# Patient Record
(demographics unavailable — no encounter records)

---

## 2024-10-31 NOTE — PHYSICAL EXAM
[Chaperone Present] : A chaperone was present in the examining room during all aspects of the physical examination [60215] : A chaperone was present during the pelvic exam. [No Acute Distress] : in no acute distress [Well developed] : well developed [Well Nourished] : ~L well nourished [FreeTextEntry2] : Charis [FreeTextEntry1] : Indication: ROBERTO Urethra was prepped in sterile fashion and then a sterile non indwelling catheter (14F) was used by me to drain the bladder. The patient tolerated the procedure well.  void: 250cc PVR: 200cc

## 2024-10-31 NOTE — HISTORY OF PRESENT ILLNESS
[FreeTextEntry1] : Patient is here for 9 months follow up for ROBERTO, recurrent UTIs, incomplete uterovaginal prolapse.  Last seen on 1/23/24 for med follow up.   History of asymptomatic bacteriuria (only symptom is odor)  8/23/2022: urodynamics: Impression mildly sensitive bladder (capacity 262 cc), incomplete bladder emptying that does not improve enough with prolapse reduction, pelvic floor hypertonicity, urethra contractility, +obstructive voiding Plan: renal imaging, consider adding muscle relaxant +/- flomax to pessary management, or intermittent self cathing or indwelling catheter 7/27/2022: CT: normal   s/p Tizanidine 2 mg BID, felt dizzy Patient and daughter counseled on treatment options for ROBERTO, saleh vs self cath vs observation or trying another muscles relaxant Patient and her daughter chose observation with renal sonograms every 6 month  12/8/2022 renal sono- small renal cortical cysts bilaterally, no calculus or hydronephrosis on either side, increased renal cortical echotexture, consistent with medical renal disease 1/21/2023 urine testing-contaminated, treated with Bactrim DS BID x 7 days  5/30/23, pelvic sonogram, normal ES: 3mm 5/30/23, renal sonogram: no hydronephrosis, snows small renal cysts bilaterally, mostly simple, sub centimeter mildly complex. Advised to follow up with Dr Francis  Estrace cream  11/20/23, +Ucx: >100K E Coli, R: Bactrim, Cipro, Levaquin I: Cefazolin, Treated with Macrobid 11/6/23, Pelvis sono: ES: 3mm, distended bladder 11/5/23, CT abdomen/pelvis: no hydronephrosis bilaterally 11/5/23, BUN/Cr: 21/1   s/p Ring and support # 3, removed 11/2023 due to bleeding  2/15/24, TV sono: ES: 2mm, normal 4/26/24, CT scan of abdomen/pelvis: no hydronephrosis per Dr Francis  Today, patient states that she is not bothered by the prolapse at this time and does not want the pessary back. Reports that she has been getting frequent UTIs and been treated by urgent care few weeks ago. Usually her symptoms is urinary odor. Saw Dr Francis recently for renal cysts, had CT scan in May, stable. Denies any current complains. Denies any bleeding. Feels that she empties the bladder well, usually has to sit a little longer to be able to empty better. Want to continue to observe and follow with renal sonograms.

## 2024-10-31 NOTE — COUNSELING
[FreeTextEntry1] : If you feel like you have an infection it is important for you to call our office and we will arrange testing of your urine.  We will contact you if the urine results are abnormal.  Please schedule renal sonogram to evaluate the kidneys.   Please continue to apply a pea size amount of estrogen cream to the opening of the vagina three times a week.   Schedule a 6 months follow up appointment with YOAN Jaimes.

## 2024-10-31 NOTE — DISCUSSION/SUMMARY
[FreeTextEntry1] : Incomplete bladder emptying PVR: 200cc Patient and daughter are counseled about ROBERTO affecting the kidneys, causing kidney damage. Will continue to observe and follow up with renal sonograms every 6 months Referral given today and follow up in 6 months   Recurrent UTIs Urine culture obtained. Will follow up. Will treat accordingly if necessary Advised that ROBERTO may cause recurrent UTIs, patient usually just has an odor only but would like to test and be treated  Atrophic Vaginitis: Advised to continue to apply a pea size amount of the cream to the opening of the vagina three nights per week.  Complete uterovaginal prolapse + complete prolapse on the exam today Patient is not bothered and does not want pessary management

## 2024-10-31 NOTE — REASON FOR VISIT
[TextEntry] : Reason for visit:  Voids per day: 5-6   Voids per night: 3-7  Urge incontinence: Yes   Stress incontinence: No Constipation: No, unsing Miralax prn Fecal incontinence: No Vaginal bulge: Yes

## 2024-10-31 NOTE — PHYSICAL EXAM
[Chaperone Present] : A chaperone was present in the examining room during all aspects of the physical examination [23705] : A chaperone was present during the pelvic exam. [No Acute Distress] : in no acute distress [Well developed] : well developed [Well Nourished] : ~L well nourished [FreeTextEntry2] : Charis [FreeTextEntry1] : Indication: ROBERTO Urethra was prepped in sterile fashion and then a sterile non indwelling catheter (14F) was used by me to drain the bladder. The patient tolerated the procedure well.  void: 250cc PVR: 200cc

## 2025-01-10 NOTE — REASON FOR VISIT
[Arrhythmia/ECG Abnorrmalities] : arrhythmia/ECG abnormalities [Structural Heart and Valve Disease] : structural heart and valve disease [Hyperlipidemia] : hyperlipidemia [Hypertension] : hypertension [Follow-Up - Clinic] : a clinic follow-up of [FreeTextEntry3] : Dr. Sage  [FreeTextEntry2] : 1 year TAVR F/U 26 mm Medtronic Valve 3/2020 [facility]

## 2025-01-10 NOTE — ASSESSMENT
[FreeTextEntry1] : The patient has a mechanical MVR for MS and has had a TAVR for AS . She did not have significant CAD in 2020 . The patient has severe peripheral neuropathy . She has been tried on multiple medications in the past . On exam her pedal pulses are  present R> L and previous arterial Doppler showed no significant stenosis  although had diffuse disease . . The patient is seeing pain management and will have her medication adjusted  The patient was admitted to [facility] with dizziness and hypeonatremia . Medications were adjusted . Her CT scan of her head showed an old cerebellar stroke . She had HCTZ stopped and was placed on torsemide . Her BP is low normal . It is uncertain why she is was given a loop diuretic . The patent was readmitted to [facility] wiht severe dehydration after having diarrhea . Torsemide has been stopped and Lisinopril was stopped . The patient has not had SOB or CP .   It is unclear if she is taking Amlodipine . She has no DVT and has a right sided Baker's cyst . He edema is stable . This may be from her Amlodipine. She is taking Furosemide . There is a midly increased RVSP on echo . RAD showed no TONJA . She has renal cysts and is going to see a urologist. It is uncertain by hisotry why she is on ASA . She has had a CVA in the past as well ( cerebellar )    The patient was seen in the ER for severe dizziness , Etiology is uncertain but may be related to her chronic cerebellar infarcts . She does have a history of havingb diastolic HF and  MVR mechanical. Her INR was not therapeutioc when  in the ER . Discussed need for coumadin center follow up and it is essential that her INR is 2.5 - 3.5 .

## 2025-01-10 NOTE — HISTORY OF PRESENT ILLNESS
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 Tim 29 mm MVR (1991 [place] - Dr. Marcos), A-fib (Coumadin), HTN, cholecystectomy, hernia repair.On 3/11/2020 the patient underwent elective TAVR.  The patient tolerated the procedure well and had an uncomplicated post-operative course..The patient has AF with controlled VR on ECG . . The patient had been seen by vascular for leg paresthesias . She had normal arterial circulation and she was told by neurology that she had a neuropathy . The patient was placed on Amitriptyline with improvement in her symptoms  The patient had fallen in [place] . She calims to have not hit her head . She had a CT scan of her head which was said to be ok . The patient has had intermittent lightheaded which is intermittent .   The patient was admitted to [facility] . She had clams and got sick afterwards. She was subsequently admitted with acute kidney injury . The patient had urinary retention . No hydro.  She improved wiht hydration and catheterization . She has been feeling fine since she has been oput of the hospital. She did have some swollen legs and this had improved . She took an extra water pill for 7 days with improvement   The patient 's main complaint has been her lower extremity pain which is thought to be from a neuropathy . She has not recieved relief from Duoxetine, Lyrica or gabapentin .  She has had SOB which she attibuted to her pain .  The patient was subsequently admitted to [facility] with dizziness . CT scan of the head showed an old cerebellar infarct and microvascular disease . She had a cardiac work up including a neuclear stress test which was negative for ischemia and LV systolic function MVR and AVR were ok  She was hyponatremic .  1- The patient was admitted to [facility] ER with lightheadedness and unsteadiness  . She had a CTA of head which showed chroni cerebellar infracts .There were irregularities of the Right MCA . She did have severe degenerative changes in cervical spine .

## 2025-01-10 NOTE — PHYSICAL EXAM
[General Appearance - Well Developed] : well developed [Normal Appearance] : normal appearance [General Appearance - Well Nourished] : well nourished [Normal Conjunctiva] : the conjunctiva exhibited no abnormalities [Normal Oral Mucosa] : normal oral mucosa [Respiration, Rhythm And Depth] : normal respiratory rhythm and effort [Exaggerated Use Of Accessory Muscles For Inspiration] : no accessory muscle use [Normal Rate] : normal [Rhythm Regular] : regular [Normal S1] : normal S1 [Normal S2] : normal S2 [Bowel Sounds] : normal bowel sounds [Abdomen Tenderness] : non-tender [Abnormal Walk] : normal gait [Nail Clubbing] : no clubbing of the fingernails [Skin Color & Pigmentation] : normal skin color and pigmentation [Skin Turgor] : normal skin turgor [] : no rash [No Venous Stasis] : no venous stasis [Oriented To Time, Place, And Person] : oriented to person, place, and time [Impaired Insight] : insight and judgment were intact [Affect] : the affect was normal [Mood] : the mood was normal [No Anxiety] : not feeling anxious

## 2025-01-10 NOTE — CARDIOLOGY SUMMARY
[de-identified] : 2-7-2022 AF LAFB Poor R wave -progressin V1-V3  6- AF LAFB  6- AF LAFB NS ST-T change.  4- AF with slow VR Poor R wave progression V1-V3  6- AF LAFB Poor  Rwave progression V1-V3  1- AF LAFB slow VR  [de-identified] : 3- Lexiscan stress test No ischemia  [de-identified] : 3- Noraml LV systolic function Normal 26 mm Core Valve Severely dilated LA mild Concentric LVH  3- Normal LV systolic function MVR Mechanical peak and mean diastolic gradient of 8 mmhg and 1 mmhg . AVR TAVPR SHAHRIAR of 2.2. 3-6-2023 EF 63% Mechanical MVR 2.9 cm2  S.P TAVR AV 1.97  mild TR RVSP was 29 mmhg   3-5-2024 EF 58% TAVR  MVR in place and functioning normqllyu RVSP was 35.7 mmhg  [de-identified] : 2020 No CAD

## 2025-02-28 NOTE — ASSESSMENT
[FreeTextEntry1] : 89-year-old female presents to the neurosurgery office today as a new patient alongside her daughter as a hospital discharge, referred also from cardiology. She is a significant cardiac history. She recently presented to the emergency department for complaints of lightheadedness and unsteadiness. Diagnostic imaging incidental identified chronic cerebral infarcts and irregularities within the right MCA however there is no warrant for any endovascular or neurosurgical intervention at this time. Patient educated to continue with healthy lifestyle modifications and medical management as she is already on and to follow-up with cardiology as already planned, Dr. Medina. She may return to neurosurgery as needed going forward.  No concerns were identified during the visit and we thank her for allowing us to be a part of her care team.   Jeanine Ruiz MS, FNP-BC Nurse Practitioner [facility]   Gerard Farooq MD, [facility]  Director, Cerebrovascular & Endovascular Dept. Of Neurosurgery  [facility]

## 2025-02-28 NOTE — HISTORY OF PRESENT ILLNESS
[de-identified] : Ms. RENNER is an 89-year-old female presenting to the neurosurgery office today as a new patient alongside her daughter, referred from cardiology as a posthospital discharge.  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 Tim 29 mm MVR (1991 [place] - Dr. Marcos), A-fib (Coumadin), HTN, cholecystectomy, hernia repair. On 3/11/2020 the patient underwent elective TAVR, doing well. She is a patient of Dr. Medina.   She recently presented to the emergency department for complaints of dizziness in which she referred to his dehydration. CT head appreciated an old cerebral infarct and microvascular disease. A cardiac workup was negative for ischemia. She was found to be hyponatremic. Patient was again admitted to the ER on 1/10/2025 for complaints of lightheadedness and unsteadiness. CTA identified chronic cerebellar infarcts and irregularities of the right MCA. Patient is maintained on Aspirin and Coumadin. She is also on statin therapy. She was educated today that there is no warrant for any neurosurgical endovascular intervention. She should follow the appropriate and most common risk factor reduction such as healthy diet, exercise, medical management. There is no need for further imaging. She will continue with her cardiology follow-ups and return to our office as needed going forward.

## 2025-02-28 NOTE — PHYSICAL EXAM
[General Appearance - Alert] : alert [General Appearance - In No Acute Distress] : in no acute distress [Oriented To Time, Place, And Person] : oriented to person, place, and time [Abnormal Walk] : normal gait [FreeTextEntry8] : with a cane

## 2025-05-02 NOTE — ASSESSMENT
[FreeTextEntry1] : The patient has a mechanical MVR for MS and has had a TAVR for AS. She did not have significant CAD in 2020. The patient has severe peripheral neuropathy. She has been tried on multiple medications in the past. On exam her pedal pulses are present R> L and previous arterial Doppler showed no significant stenosis although had diffuse disease. The patient is seeing pain management and will have her medication adjusted  The patient was admitted to [facility] with dizziness and hyponatremia. Medications were adjusted. Her CT scan of her head showed an old cerebellar stroke. She had HCTZ stopped and was placed on torsemide. Her BP is low normal. It is uncertain why she is was given a loop diuretic. The patent was readmitted to [facility] with severe dehydration after having diarrhea. Torsemide has been stopped, and Lisinopril was stopped. The patient has not had SOB or CP.   It is unclear if she is taking Amlodipine. She has no DVT and has a right sided Baker's cyst. Her edema is stable. This may be from her Amlodipine. She is taking Furosemide. There is a mildly increased RVSP on echo. RAD showed no TONJA. She has renal cysts and is going to see a urologist. It is uncertain by history why she is on ASA. She has had a CVA in the past as well (cerebellar)    The patient was seen in the ER for severe dizziness. Etiology is uncertain but may be related to her chronic cerebellar infarcts. She does have a history of having diastolic HF and MVR mechanical. Her INR was not therapeutic when in the ER. Discussed need for coumadin center follow up and it is essential that her INR is 2.5 - 3.5.  5/2/2025 Dizziness has improved since last office visit, followed with neurology for this. Patient has stopped taking Ferrous Sulfate, will order for repeat labs including Iron and Ferritin. No shortness of breath or LE swelling. MCOT show AF burden of 97%, no other arrythmias as a cause of dizziness. Patient's coumadin and INR is being managed by PCP Dr. Reeves, INR goal of 2.5-3.5.    Plan: Continue current medications Blood work  Follow up in 3 months with Dr. Medina

## 2025-05-02 NOTE — HISTORY OF PRESENT ILLNESS
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 Tim 29 mm MVR (1991 [place] - Dr. Marcos), A-fib (Coumadin), HTN, cholecystectomy, hernia repair. On 3/11/2020 the patient underwent elective TAVR. The patient tolerated the procedure well and had an uncomplicated post-operative course. The patient has AF with controlled VR on ECG. The patient had been seen by vascular for leg paresthesia's. She had normal arterial circulation, and she was told by neurology that she had a neuropathy. The patient was placed on Amitriptyline with improvement in her symptoms  The patient had fallen in [place]. She claims to have not hit her head. She had a CT scan of her head which was said to be ok. The patient has had intermittent lightheaded which is intermittent.   The patient was admitted to [facility]. She had clams and got sick afterwards. She was subsequently admitted with acute kidney injury. The patient had urinary retention. No hydro. She improved with hydration and catheterization. She has been feeling fine since she has been out of the hospital. She did have some swollen legs and this had improved. She took an extra water pill for 7 days with improvement.  The patient 's main complaint has been her lower extremity pain which is thought to be from a neuropathy. She has not received relief from Duloxetine, Lyrica or gabapentin. She has had SOB which she attributed to her pain.  The patient was subsequently admitted to [facility] with dizziness. CT scan of the head showed an old cerebellar infarct and microvascular disease. She had a cardiac work up including a nuclear stress test which was negative for ischemia and LV systolic function MVR and AVR were ok. She was hyponatremic.  1- The patient was admitted to [facility] ER with lightheadedness and unsteadiness. She had a CTA of head which showed chronic cerebellar infracts. There were irregularities of the Right MCA. She did have severe degenerative changes in cervical spine.   5-2-20250 No chest pain, palpitations, syncope, or LE swelling. Reports PITTMAN occasionally. Reports improvement in dizziness. Patient has since followed with neurologist, no further work up was needed. Patient's INR and coumadin is managed by PCP Dr Reeves.

## 2025-05-02 NOTE — CARDIOLOGY SUMMARY
[de-identified] : 2-7-2022 AF LAFB Poor R wave -progressin V1-V3  6- AF LAFB  6- AF LAFB NS ST-T change.  4- AF with slow VR Poor R wave progression V1-V3  6- AF LAFB Poor  Rwave progression V1-V3  1- AF LAFB slow VR  5-2-2025 AF with slow VR response  [de-identified] : OT 1/10/25-1/23/25 AF burden 97%, PVC burden of <1%  [de-identified] : 3- Lexiscan stress test No ischemia  [de-identified] : 3- Noraml LV systolic function Normal 26 mm Core Valve Severely dilated LA mild Concentric LVH  3- Normal LV systolic function MVR Mechanical peak and mean diastolic gradient of 8 mmhg and 1 mmhg . AVR TAVPR SHAHRIAR of 2.2. 3-6-2023 EF 63% Mechanical MVR 2.9 cm2  S.P TAVR AV 1.97  mild TR RVSP was 29 mmhg  3-5-2024 EF 58% TAVR MVR in place and functioning normqllyu RVSP was 35.7 mmhg  3- LVEF 69%, moderate G2DD, biprosthetic TAVR, mechanical MVR in place.  [de-identified] : 2020 No CAD

## 2025-05-08 NOTE — PHYSICAL EXAM
[Chaperoned Physical Exam] : A chaperone was present in the examining room during all aspects of the physical examination. [MA] : MA [No Acute Distress] : in no acute distress [Well developed] : well developed [Well Nourished] : ~L well nourished [FreeTextEntry2] : Ashley [FreeTextEntry1] : Indication: ROBERTO Urethra was prepped in sterile fashion and then a sterile non indwelling catheter (14F) was used by me to drain the bladder. The patient tolerated the procedure well.  void: 150cc PVR: 260cc

## 2025-05-08 NOTE — REASON FOR VISIT
[TextEntry] : Reason for visit: F/U visit  Voids per day:   10 Voids per night:   3-5 Urge incontinence Yes Stress incontinence: No Constipation: No Fecal incontinence: No Vaginal bulge: yes

## 2025-05-08 NOTE — COUNSELING
[FreeTextEntry1] : If you feel like you have an infection it is important for you to call our office and we will arrange testing of your urine.  We will contact you with urine results if abnormal.    Please schedule renal sonogram to evaluate the kidneys.  Please continue to apply a pea size amount of estrogen cream to the opening of the vagina three times a week.   Please call my office if you have any issues with the cost or side effects of the medication.   Schedule a 1 months follow up pessary fitting appointment with YOAN Jaimes ([facility]) .

## 2025-05-08 NOTE — HISTORY OF PRESENT ILLNESS
[FreeTextEntry1] : Patient is here for 6 weeks/months med check for ROBERTO, recurrent UTIs, incomplete uterovaginal prolapse. Last seen on 10/31/24 for follow up.    History of asymptomatic bacteriuria (only symptom is odor)  8/23/2022: urodynamics: Impression mildly sensitive bladder (capacity 262 cc), incomplete bladder emptying that does not improve enough with prolapse reduction, pelvic floor hypertonicity, urethra contractility, +obstructive voiding Plan: renal imaging, consider adding muscle relaxant +/- flomax to pessary management, or intermittent self cathing or indwelling catheter 7/27/2022: CT: normal   s/p Tizanidine 2 mg BID, felt dizzy Patient and daughter counseled on treatment options for ROBERTO, saleh vs self cath vs observation or trying another muscles relaxant Patient and her daughter chose observation with renal sonograms every 6 month  12/8/2022 renal sono- small renal cortical cysts bilaterally, no calculus or hydronephrosis on either side, increased renal cortical echotexture, consistent with medical renal disease 1/21/2023 urine testing-contaminated, treated with Bactrim DS BID x 7 days  5/30/23, pelvic sonogram, normal ES: 3mm 5/30/23, renal sonogram: no hydronephrosis, snows small renal cysts bilaterally, mostly simple, sub centimeter mildly complex. 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 would like to check if she has infections. She is not bothered by the prolapse but is open to trying another pessary to see if it could help her empty the bladder better. Did not do renal sono as ordered at last visit, will do it now.

## 2025-05-08 NOTE — DISCUSSION/SUMMARY
[FreeTextEntry1] : Incomplete bladder emptying PVR: 260cc Discussed trying another pessary fitting, it's been more than 2 years since she had a pessary, to see if it could help pt to empty her bladder better Patient is agreable Follow up in 1 month for pessary fitting with YOAN Jaimes.   Recurrent UTIs Urine culture obtained. Will follow up. Will treat accordingly if necessary  Complete uterovaginal prolapse + complete prolapse on exam Follow up for pessary fitting

## 2025-07-15 NOTE — REASON FOR VISIT
[TextEntry] : CC: Pessary fitting, Pt says that she has slight burning when urinating. Burning was very bad Sunday night.

## 2025-07-15 NOTE — DISCUSSION/SUMMARY
[FreeTextEntry1] : Complete Uterovaginal Prolapse: Ring and support # 2 placed without difficulty. Remained in place with Valsalva, coughing, and ambulating. Fitter pessary removed. New pessary inserted. The patient tolerated all fittings well. Follow up in 1 month for pessary check/PVR check.   Postmenopausal bleeding Not sure if it's uterine or rectal, will order TV sono for evaluation  Atrophic Vaginitis Hold off estrace at this time  Constipation Bowel recipe provided  Recurrent UTIs Urine culture obtained. Will follow up. Will treat accordingly if necessary Will advise D Mannose/cranberry extract at next visit

## 2025-07-15 NOTE — PHYSICAL EXAM
[Chaperoned Physical Exam] : A chaperone was present in the examining room during all aspects of the physical examination. [MA] : MA [FreeTextEntry2] : Buck [No Acute Distress] : in no acute distress [Well developed] : well developed [Well Nourished] : ~L well nourished

## 2025-07-15 NOTE — END OF VISIT
"Can you please reach out to patient to schedule office visit with Dr Barcenas? This is Dr Viet's mother in law. Needs to discuss options regarding her recurring arrhythmias. Dr Barcenas said to "sprinkle fairy dust" and make it happen. I don't think she should wait until May.  Thanks  "
[TextEntry] :  IFiona PA-C, spent 30 minutes face to face with the patient. This excludes fitting 
[TextEntry] :  IFiona PA-C, spent 30 minutes face to face with the patient. This excludes fitting

## 2025-07-15 NOTE — HISTORY OF PRESENT ILLNESS
[FreeTextEntry1] : Patient is here for pessary fitting for complete uterovaginal prolapse and ROBERTO. Patient is here with her daughter, Nikki.  Last seen on 5/8/25 for follow up.  8/23/2022: urodynamics: Impression mildly sensitive bladder (capacity 262 cc), incomplete bladder emptying that does not improve enough with prolapse reduction, pelvic floor hypertonicity, urethra contractility, +obstructive voiding Plan: renal imaging, consider adding muscle relaxant +/- flomax to pessary management, or intermittent self cathing or indwelling catheter 7/27/2022: CT: normal   s/p Tizanidine 2 mg BID, felt dizzy Patient and daughter counseled on treatment options for ROBERTO, saleh vs self cath vs observation or trying another muscles relaxant Patient and her daughter chose observation with renal sonograms every 6 month  12/8/2022 renal sono- small renal cortical cysts bilaterally, no calculus or hydronephrosis on either side, increased renal cortical echotexture, consistent with medical renal disease 1/21/2023 urine testing-contaminated, treated with Bactrim DS BID x 7 days  5/30/23, pelvic sonogram, normal ES: 3mm 5/30/23, renal sonogram: no hydronephrosis, snows small renal cysts bilaterally, mostly simple, sub centimeter mildly complex. 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 Reports using estace cream helps with the burning. Still feels that she does not empty the bladder well. Would like to have pessary fitting today. Also notes seeing blood on her liner today in the office, she feels it's from hemorhoids, she has blood on the tissued at time after wiping after bowel movements. Admits to having constipation and uses Miralax. Saw GI, had colonoscopy done.

## 2025-07-15 NOTE — COUNSELING
[FreeTextEntry1] : Please call the office if you have any issues with vaginal pain, vaginal bleeding, difficulty urinating or having a bowel movement or if the pessary falls out so that we can arrange another size pessary.  We will contact you with urine results.   Please schedule a transvaginal sonogram to evaluate the lining of the uterus due to the bleeding.  Please follow up for pessary/PVR check in 4 weeks with YOAN Jaimes.

## 2025-07-24 NOTE — PROCEDURE
[Trigger point 1-2 muscle groups] : trigger point 1-2 muscle groups [Cervical paraspinal muscle] : cervical paraspinal muscle [Pain] : pain [Alcohol] : alcohol [Ethyl Chloride sprayed topically] : ethyl chloride sprayed topically [Sterile technique used] : sterile technique used [___ cc    0.5%] : Bupivacaine (Marcaine) ~Vcc of 0.5%  [____] : [unfilled] [] : Patient tolerated procedure well [Call if redness, pain or fever occur] : call if redness, pain or fever occur [Apply ice for 15min out of every hour for the next 12-24 hours as tolerated] : apply ice for 15 minutes out of every hour for the next 12-24 hours as tolerated [Previous OTC use and PT nontherapeutic] : patient has tried OTC's including aspirin, Ibuprofen, Aleve, etc or prescription NSAIDS, and/or exercises at home and/or physical therapy without satisfactory response [Risks, benefits, alternatives discussed / Verbal consent obtained] : the risks benefits, and alternatives have been discussed, and verbal consent was obtained

## 2025-07-25 NOTE — PHYSICAL EXAM
[de-identified] : L spine   No rashes, erythema, edema noted TTP b/l lumbar paraspinal muscles Positive facet loading bilaterally Positive KEMPS test bilaterally LLE: 5/5 strength RLE: 5/5 strength Sensation intact b/l LE

## 2025-07-25 NOTE — DISCUSSION/SUMMARY
[de-identified] : A discussion regarding available pain management treatment options occurred with the patient.  These included interventional, rehabilitative, pharmacological, and alternative modalities. We will proceed with the following:  Interventions: 1. Done in office today: Cervical trigger point injection.  The risks, benefits and alternatives of the proposed procedure were explained in detail with the patient.  The risks outlined include, but are not limited to, infection, bleeding, nerve injury, a temporary increase in pain, failure to resolve symptoms, allergic reaction, and possible elevation of blood sugar in diabetics.  All questions were answered to patient's apparent satisfaction and he/she verbalized an understanding.  Medications: 1. Refilled Gabapentin 100 mg qD prn. (30-day supply)  Potential side effects were discussed which included dizziness, sedation, and pedal edema to name a few. If the patient cannot tolerate these side effects should they occur, then they will stop the medication. If the patient experiences sedation, they understand that they should not drive. The usage of the medication was discussed and the patient understands that it is an anti-epileptic medication used for neuropathic pain and that the pain relief from this medication will likely be subtle, and that hopefully in combination with the other treatments we are offering we will be able to get some additive relief.   - Follow up in 3 months.   I Nata Hunter attest that this documentation has been prepared under the direction and in the presence of provider Dr. Alessandro Villalobos.  The documentation recorded by the scribe in my presence, accurately reflects the service I personally performed, and the decisions made by me with my edits as appropriate.   Alessandro Villalobos, DO

## 2025-07-25 NOTE — PHYSICAL EXAM
[de-identified] : L spine   No rashes, erythema, edema noted TTP b/l lumbar paraspinal muscles Positive facet loading bilaterally Positive KEMPS test bilaterally LLE: 5/5 strength RLE: 5/5 strength Sensation intact b/l LE

## 2025-07-25 NOTE — DISCUSSION/SUMMARY
[de-identified] : A discussion regarding available pain management treatment options occurred with the patient.  These included interventional, rehabilitative, pharmacological, and alternative modalities. We will proceed with the following:  Interventions: 1. Done in office today: Cervical trigger point injection.  The risks, benefits and alternatives of the proposed procedure were explained in detail with the patient.  The risks outlined include, but are not limited to, infection, bleeding, nerve injury, a temporary increase in pain, failure to resolve symptoms, allergic reaction, and possible elevation of blood sugar in diabetics.  All questions were answered to patient's apparent satisfaction and he/she verbalized an understanding.  Medications: 1. Refilled Gabapentin 100 mg qD prn. (30-day supply)  Potential side effects were discussed which included dizziness, sedation, and pedal edema to name a few. If the patient cannot tolerate these side effects should they occur, then they will stop the medication. If the patient experiences sedation, they understand that they should not drive. The usage of the medication was discussed and the patient understands that it is an anti-epileptic medication used for neuropathic pain and that the pain relief from this medication will likely be subtle, and that hopefully in combination with the other treatments we are offering we will be able to get some additive relief.   - Follow up in 3 months.   I Nata Hunter attest that this documentation has been prepared under the direction and in the presence of provider Dr. Alessandro Villalobos.  The documentation recorded by the scribe in my presence, accurately reflects the service I personally performed, and the decisions made by me with my edits as appropriate.   Alessandro Villalobos, DO

## 2025-07-25 NOTE — HISTORY OF PRESENT ILLNESS
[FreeTextEntry1] : ORIGINAL HPI: Myesha is 87 years old presenting with mid back pain more on the left side that started years ago. The pain has waxed and waned over the years but over the past couple of months the pain is more significant and constant in nature. She notes that when she is at rest an with limited motion the pain is tolerable and at times non-existent. But when she twists and turns and is active the pain can stop her in her tracks. It is made worse by palpation and stretching. She was prescribed amitriptyline from Dr. Iliana Goins after she had mentioned this pain to him. She has tried other pain medications in the past and failed including NSAIDs, neuropathic agents like gabapentin, and other over-the-counter medications. The patient is on warfarin for mechanical valve and she is concerned about taking new medications that might interact with the warfarin. She had bad experiences with being hospitalized after having an elevated INR and was worked up for GI bleeding. She notes that she only takes the amitriptyline once every couple of days because she is concerned about bleeding risk. The pain at rest can be as low as a 2 or 3 but with certain movements and with palpation of the thoracolumbar spine and left rib angle can reproduce pain upwards of an 8 or 9/10.  12/12/23: This patient is a status post Left L3-L4, L4-5, L5-S1 facet block on 11/7/23 with 90% of pain relief with sustained relief today. The patients low back pain has resolved. Her main concern today is her numbing in both legs and feet. The patients reports of constant pins and needles in both feet in which has started to travel up her legs. The pain is made worse at night with disrupting her sleep and tolerable during the day. The pain today is 7/10 on the pain scale. Patient denies any bowel or bladder dysfunction, incontinence, or saddle anesthesia.  TODAY, 2-: Revisit encounter. She is here with her daughter today.   With regards to her lower back pain, she has burning and sharp shooting pains in the legs and feet. She has numbness and tingling which is present daily and radiate into the feet. She states the leg symptoms are present daily and constant. Sleeping at night is an issue due to the severity of the pain. She does also have secondary axial lower back pain. Pain is associated with stiffness and spasms in the back which is made worse with rotational movements.   With regards to her bilateral knee pain, she states there is pain with standing or walking for extended periods of time. She states she is unable to walk up or down her stairs without pain. She states she is constantly in the kitchen cooking and is doing her daily functions throughout the day which causes severe pain at night that makes her cry. She states the knee pain has been waxing and weening.  She was recently started on Pregabalin 75 mg BID which she stopped taking due to in effectivity.   TODAY, 4-: Revisit encounter.   Since her last visit, she underwent a Bilateral L3, L4, L5 medial branch facet block on 3-. She affords about 85% relief from this procedure. She had decrease in pain, increase in function and better mobility. She continues today with sustained relief. Her pain is currently tolerable. She states her pain complaint is her foot pain. She states the pain is in the bottom of her feet and refers upwards secondary to Neuropathy.   She is managing her pain with Gabapentin 300 mg q12h which does provide her with 30% relief.   TODAY, 7-: Revisit encounter. She is accompanied by her family member today.   With regards to her lower back pain, she is presenting with symptoms which are under control. She is able to tolerate her symptoms.   Today, her main complaint is neck pain. Pain is mainly on the right side of the neck and refers into the paraspinal area and into the trapezius. She notes tenderness along with stiffness and spasms in the neck. Pain is made worse with rotating to the right. Pain has been present daily. She has been managing her pain with Gabapentin 300 mg qhs along with Gabapentin 100 mg qAM.   7-: Revisit encounter. She is accompanied by her daughter today. She is presenting after a long delay. She continues to experience ongoing pain in the neck and lower back. There is radicular feature in the legs however she believes that is due to the Neuropathy. She feels like her neck pain refers into the trapezius region. She notes stiffness and tenderness. She rates the pain at a 7/10 on the pain scale. She was using Gabapentin however she did not like the way she felt on 300 mg. Dr. Reeves advised her to come off of it and then her pain returned. She is now taking Gabapentin 100 mg.

## 2025-07-25 NOTE — DATA REVIEWED
[FreeTextEntry1] : MRI lumbar spine without contrast  Severe curvature of the lower lumbar spine convex to the right. Markedly degenerative bulging disc at L2-3 with right foraminal compromise  Broad-based left-sided disc protrusion at L3-4 with moderate left foraminal compromise  Left-sided facet arthropathy noted in the lumbar spine.